# Patient Record
Sex: FEMALE | Race: WHITE
[De-identification: names, ages, dates, MRNs, and addresses within clinical notes are randomized per-mention and may not be internally consistent; named-entity substitution may affect disease eponyms.]

---

## 2018-06-11 ENCOUNTER — HOSPITAL ENCOUNTER (EMERGENCY)
Dept: HOSPITAL 5 - ED | Age: 53
Discharge: HOME | End: 2018-06-11
Payer: COMMERCIAL
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
## 2018-06-11 DIAGNOSIS — S82.851A: Primary | ICD-10-CM

## 2018-06-11 DIAGNOSIS — I25.2: ICD-10-CM

## 2018-06-11 DIAGNOSIS — E11.9: ICD-10-CM
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
## 2018-06-11 LAB
BASOPHILS # (AUTO): 0.1 K/MM3 (ref 0–0.1)
BASOPHILS NFR BLD AUTO: 0.7 % (ref 0–1.8)
BUN SERPL-MCNC: 14 MG/DL (ref 7–17)
BUN/CREAT SERPL: 35 %
CALCIUM SERPL-MCNC: 9.8 MG/DL (ref 8.4–10.2)
EOSINOPHIL # BLD AUTO: 0.3 K/MM3 (ref 0–0.4)
EOSINOPHIL NFR BLD AUTO: 2.2 % (ref 0–4.3)
HCT VFR BLD CALC: 45.7 % (ref 30.3–42.9)
HEMOLYSIS INDEX: 25
HGB BLD-MCNC: 14.8 GM/DL (ref 10.1–14.3)
LYMPHOCYTES # BLD AUTO: 1.8 K/MM3 (ref 1.2–5.4)
LYMPHOCYTES NFR BLD AUTO: 13.9 % (ref 13.4–35)
MCH RBC QN AUTO: 29 PG (ref 28–32)
MCHC RBC AUTO-ENTMCNC: 32 % (ref 30–34)
MCV RBC AUTO: 91 FL (ref 79–97)
MONOCYTES # (AUTO): 0.8 K/MM3 (ref 0–0.8)
MONOCYTES % (AUTO): 5.7 % (ref 0–7.3)
PLATELET # BLD: 263 K/MM3 (ref 140–440)
RBC # BLD AUTO: 5.04 M/MM3 (ref 3.65–5.03)

## 2018-06-11 PROCEDURE — 85025 COMPLETE CBC W/AUTO DIFF WBC: CPT

## 2018-06-11 PROCEDURE — 90471 IMMUNIZATION ADMIN: CPT

## 2018-06-11 PROCEDURE — 96374 THER/PROPH/DIAG INJ IV PUSH: CPT

## 2018-06-11 PROCEDURE — 36415 COLL VENOUS BLD VENIPUNCTURE: CPT

## 2018-06-11 PROCEDURE — 27818 TREATMENT OF ANKLE FRACTURE: CPT

## 2018-06-11 PROCEDURE — 73590 X-RAY EXAM OF LOWER LEG: CPT

## 2018-06-11 PROCEDURE — 73610 X-RAY EXAM OF ANKLE: CPT

## 2018-06-11 PROCEDURE — 90715 TDAP VACCINE 7 YRS/> IM: CPT

## 2018-06-11 PROCEDURE — 82962 GLUCOSE BLOOD TEST: CPT

## 2018-06-11 PROCEDURE — 80048 BASIC METABOLIC PNL TOTAL CA: CPT

## 2018-06-11 PROCEDURE — 99285 EMERGENCY DEPT VISIT HI MDM: CPT

## 2018-06-11 RX ADMIN — FENTANYL CITRATE PRN MCG: 50 INJECTION, SOLUTION INTRAMUSCULAR; INTRAVENOUS at 16:06

## 2018-06-11 RX ADMIN — FENTANYL CITRATE PRN MCG: 50 INJECTION, SOLUTION INTRAMUSCULAR; INTRAVENOUS at 18:19

## 2018-06-11 NOTE — XRAY REPORT
FINAL REPORT



EXAM:  XR TIBIA FIBULA 2V RT



HISTORY:  Fell down 2 steps 



TECHNIQUE:  Frontal and lateral views of right tibia and fibula.



PRIORS:  None.



FINDINGS:  

Fractures of distal tibia, including probable medial and

posterior malleoli, and also distal fibular metaphysis, with mild

displacement and anterior angulation. Probable anteromedial

subluxation or dislocation of tibiotalar joint. Soft tissue

edema.  



Remainder of osseous and soft tissue structures grossly

unremarkable. 



IMPRESSION:  

1. Probable trimalleolar fracture and dislocation in right distal

tibia and fibula as reported. Correlation with dedicated right

ankle radiographs may help in further evaluation, as clinically

indicated.

## 2018-06-11 NOTE — EMERGENCY DEPARTMENT REPORT
ED Fall HPI





- General


Chief Complaint: Fall


Stated Complaint: FALL


Time Seen by Provider: 18 18:40


Source: patient, EMS


Mode of arrival: Ambulatory





- History of Present Illness


Initial Comments: 


Slid down the stairs 4 hours prior to arrival here with obvious deformity to 

the right ankle with a small abrasion with a valgus deformity to the right 

ankle she does have good neurovascular intact there is an abrasion at the 

medial malleolus


-: Sudden, hour(s)


Fall From: standing


When Fall Occurred: unsure


Fall Witnessed: yes, by family


Place Fall Occurred: home


Loss of Consciousness: none


Prolonged Down Time?: no


Symptoms Prior to Fall: none


Location: other (right ankle)


Location - Extremities: Right: Ankle


Severity: mild, moderate


Severity scale (0 -10): 3


Quality: sharp


Associated Symptoms: denies.  denies: headache, neck pain, numbness, weakness, 

chest paint, shortness of breath, abdominal pain, hematuria, unable to walk, 

lightheaded, vertigo, confusion





- Related Data


 Allergies











Allergy/AdvReac Type Severity Reaction Status Date / Time


 


No Known Allergies Allergy   Unverified 18 15:46














ED Review of Systems


ROS: 


Stated complaint: FALL


Other details as noted in HPI





Comment: All other systems reviewed and negative


Constitutional: denies: diaphoresis, fever, malaise


Eyes: denies: eye discharge, vision change


ENT: denies: dental pain, hearing loss, epistaxis


Respiratory: denies: shortness of breath, SOB with exertion, SOB at rest, 

stridor


Cardiovascular: denies: chest pain, palpitations, dyspnea on exertion, orthopnea

, edema, syncope, paroxysmal nocturnal dyspnea


Gastrointestinal: denies: abdominal pain, nausea, vomiting, diarrhea, 

constipation, hematemesis, melena


Genitourinary: denies: frequency, hematuria, discharge


Musculoskeletal: joint swelling, arthralgia, myalgia


Skin: lesions, other (small abrasion over her right medial malleolus appears 

superficial).  denies: rash, pruritus


Neurological: denies: headache, weakness, numbness, paresthesias, confusion, 

abnormal gait





ED Past Medical Hx





- Past Medical History


Previous Medical History?: Yes


Hx Heart Attack/AMI: Yes ()


Hx Diabetes: Yes





- Surgical History


Past Surgical History?: Yes


Additional Surgical History: , stent 





- Social History


Smoking Status: Unknown if ever smoked


Substance Use Type: None





ED Physical Exam





- General


Limitations: No Limitations


General appearance: alert, anxious





- Head


Head exam: Present: atraumatic, normocephalic





- Eye


Eye exam: Present: PERRL, EOMI





- ENT


ENT exam: Present: normal exam, normal orophraynx





- Neck


Neck exam: Present: normal inspection.  Absent: tenderness, meningismus





- Respiratory


Respiratory exam: Present: normal lung sounds bilaterally, other (atraumatic).  

Absent: respiratory distress, wheezes, rales, rhonchi, stridor





- Cardiovascular


Cardiovascular Exam: Present: regular rate, normal rhythm, other (atraumatic 

pulses equal bilaterally)





- GI/Abdominal


GI/Abdominal exam: Present: soft.  Absent: distended, tenderness, guarding, 

rebound, rigid, mass, pulsatile mass





- Extremities Exam


Extremities exam: Present: other (valgus deformity right ankle good capillary 

refill warm to touch no cyanosis good pulses appears Norvasc intact although 

patient is uncooperative secondary to pain [place] the extremities are within 

normal limits back is nontender neck is nontender)





- Back Exam


Back exam: Present: normal inspection, full ROM, CVA tenderness (R).  Absent: 

tenderness, CVA tenderness (L), muscle spasm, paraspinal tenderness, vertebral 

tenderness, rash noted





- Neurological Exam


Neurological exam: Present: alert, oriented X3, CN II-XII intact.  Absent: 

motor sensory deficit





- Psychiatric


Psychiatric exam: Present: normal affect, anxious





- Skin


Skin exam: Present: warm, abrasion, ecchymosis, other (superficial abrasion 

right lateral malleolus).  Absent: petechiae





ED Course


 Vital Signs











  18





  15:38


 


Temperature 98.0 F


 


Pulse Rate 98 H


 


Respiratory 20





Rate 














- Orthopedic Fracture Reduction


  ** Fracture #1


Consent Obtained: verbal consent


Time Out Performed: Yes


Side: right


Fracture Reduction Location: other (ankleleank)


Analgesia: other (fentanyl for pain control)


Technique: traction splint


Post Reduction X-rays Demonstrate: acceptable reduction


Post-Reduction Neuro Exam: intact


Post-Reduction Vascular Exam: intact


Splint Applied: Yes


Patient Tolerated Procedure: well, no complications





ED Medical Decision Making





- Lab Data


Result diagrams: 


 18 16:00





 18 16:00





- Radiology Data


Radiology results: image reviewed





- Medical Decision Making





Patient did arrive in a splint she did have neurovascular intact she was 

started IV access and pain control, x-ray does reveal a trimalleolar with 

displacement, I did consent the patient verbally for closed reduction she was 

alert and oriented 3 not intoxicated was aware of the risks including 

neurovascular problems worsening of the injury she did consent verbally for 

closed reduction, x-ray was obtained closed reduction was obtained with good 

improvement of the fracture we did splint this in a stirrup as well as a 

posterior, she was neurovascular intact pre-and post, the skin break is 

superficial this does not appear to be an open dislocation or open fracture, 

case was discussed with Dr. Elise of orthopedics, he is recommending 

outpatient follow-up in his office, we will elect to provide pain control as 

well as antibiotics, she did get a tetanus shot.  She did verbalize 

understanding of the emergent need to see Dr. Elise tomorrow for ORIF patient 

was discharged stable condition close neurovascular intact


Critical care attestation.: 


If time is entered above; I have spent that time in minutes in the direct care 

of this critically ill patient, excluding procedure time.








ED Disposition


Clinical Impression: 


 Closed right ankle fracture





Disposition: - TO HOME OR SELFCARE


Is pt being admited?: No


Condition: Stable


Instructions:  Ankle Fracture (ED), Abrasion (ED)


Additional Instructions: 


Nonweightbearing with crutches, rest ice elevate the injury, return immediately 

if door alarming symptoms such as worse pain numbness color temperature changes 

or call 911 see the doctor listed tomorrow


Referrals: 


PRIMARY MD JASSON [Primary Care Provider] - 3-5 Days


QUIN ELISE MD [Staff Physician] - 24 Hours


Time of Disposition: 20:47

## 2018-06-11 NOTE — XRAY REPORT
FINAL REPORT



EXAM:  XR ANKLE 3+V RT



HISTORY:  post redUC 



TECHNIQUE:  3 views of right ankle in fiberglass splint/cast.



PRIORS:  Earlier on same date. 



FINDINGS:  

Fractures in the distal tibia medial and posterior malleoli and

also distal fibular metaphysis again noted, with decreased

displacement and improved positioning. Tibiotalar joint normally

relocated. Mildly increased medial clear space of ankle mortise. 



Splint/cast artifact obscures remainder of fine osseous and soft

tissue detail. 



IMPRESSION:  

1. Status post reduction of trimalleolar fracture and tibiotalar

dislocation in right ankle.

## 2018-06-12 VITALS — DIASTOLIC BLOOD PRESSURE: 52 MMHG | SYSTOLIC BLOOD PRESSURE: 125 MMHG
